# Patient Record
Sex: FEMALE | Race: BLACK OR AFRICAN AMERICAN | NOT HISPANIC OR LATINO | Employment: OTHER | URBAN - METROPOLITAN AREA
[De-identification: names, ages, dates, MRNs, and addresses within clinical notes are randomized per-mention and may not be internally consistent; named-entity substitution may affect disease eponyms.]

---

## 2017-02-02 ENCOUNTER — OFFICE VISIT (OUTPATIENT)
Dept: OBSTETRICS AND GYNECOLOGY | Facility: CLINIC | Age: 67
End: 2017-02-02
Payer: MEDICARE

## 2017-02-02 VITALS
BODY MASS INDEX: 29.03 KG/M2 | DIASTOLIC BLOOD PRESSURE: 80 MMHG | WEIGHT: 185 LBS | SYSTOLIC BLOOD PRESSURE: 140 MMHG | HEIGHT: 67 IN

## 2017-02-02 DIAGNOSIS — N90.89 VULVAL LESION: Primary | ICD-10-CM

## 2017-02-02 DIAGNOSIS — L29.2 VULVAR ITCHING: ICD-10-CM

## 2017-02-02 PROCEDURE — 99202 OFFICE O/P NEW SF 15 MIN: CPT | Mod: PBBFAC,PO | Performed by: OBSTETRICS & GYNECOLOGY

## 2017-02-02 PROCEDURE — 99203 OFFICE O/P NEW LOW 30 MIN: CPT | Mod: S$PBB,,, | Performed by: OBSTETRICS & GYNECOLOGY

## 2017-02-02 PROCEDURE — 99999 PR PBB SHADOW E&M-NEW PATIENT-LVL II: CPT | Mod: PBBFAC,,, | Performed by: OBSTETRICS & GYNECOLOGY

## 2017-02-02 RX ORDER — BENAZEPRIL HYDROCHLORIDE 40 MG/1
40 TABLET ORAL
COMMUNITY
Start: 2016-05-19 | End: 2017-04-03

## 2017-02-02 RX ORDER — NIFEDIPINE 60 MG/1
60 TABLET, EXTENDED RELEASE ORAL
COMMUNITY
Start: 2016-05-19 | End: 2017-04-03

## 2017-02-02 RX ORDER — BISOPROLOL FUMARATE AND HYDROCHLOROTHIAZIDE 10; 6.25 MG/1; MG/1
TABLET ORAL
COMMUNITY
Start: 2016-12-22

## 2017-02-02 RX ORDER — ACETAMINOPHEN AND PHENYLEPHRINE HCL 325; 5 MG/1; MG/1
10000 TABLET ORAL
COMMUNITY

## 2017-02-02 RX ORDER — GLYBURIDE 5 MG/1
5 TABLET ORAL
COMMUNITY
Start: 2016-05-19

## 2017-02-02 NOTE — MR AVS SNAPSHOT
"    [facility]  [address] [facility]  [place] [address]  Phone: [phone]  Fax: [phone]                  Jessica Barrera   2017 9:30 AM   Office Visit    Description:  Female : 1950   Provider:  Petrona Wiggins MD   Department:  [facility]           Reason for Visit     Gynecologic Exam                To Do List           Future Appointments        Provider Department Dept Phone    4/3/2017 11:15 AM Petrona Wiggins MD '[facility]/ -[phone]      Goals (5 Years of Data)     None      [facility]     [facility] Care [facility] -  Assistance  Registered nurses in the [facility] provide clinical advisement, health education, appointment booking, and other advisory services.  Call for this free service at [phone].             Medications           Message regarding Medications     Verify the changes and/or additions to your medication regime listed below are the same as discussed with your clinician today.  If any of these changes or additions are incorrect, please notify your healthcare provider.             Verify that the below list of medications is an accurate representation of the medications you are currently taking.  If none reported, the list may be blank. If incorrect, please contact your healthcare provider. Carry this list with you in case of emergency.           Current Medications     benazepril (LOTENSIN) 40 MG tablet Take 40 mg by mouth.    biotin 10,000 mcg Cap Take 10,000 mcg by mouth.    bisoprolol-hydrochlorothiazide (ZIAC) 10-6.25 mg per tablet TAKE 1 TABLET BY MOUTH DAILY.    glyBURIDE (DIABETA) 5 MG tablet Take 5 mg by mouth.    liraglutide 0.6 mg/0.1 mL, 18 mg/3 mL, subq PNIJ 0.6 mg/0.1 mL (18 mg/3 mL) PnIj 1.8 mg.    nifedipine (ADALAT CC) 60 MG TbSR Take 60 mg by mouth.           Clinical Reference Information           Your Vitals Were     BP Height Weight BMI       140/80 5' 7" (1.702 m) 83.9 kg (185 lb) 28.98 kg/m2     "   Blood Pressure          Most Recent Value    BP  (!)  140/80      Allergies as of 2/2/2017     No Known Allergies      Immunizations Administered on Date of Encounter - 2/2/2017     None      [facility] Sign-Up     Activating your [facility] account is as easy as 1-2-3!     1) Visit [email or web address], select Sign Up Now, enter this activation code and your date of birth, then select Next.  [email or web address]  Expires: 3/19/2017 10:49 AM      2) Create a username and password to use when you visit [facility] in the future and select a security question in case you lose your password and select Next.    3) Enter your e-mail address and click Sign Up!    Additional Information  If you have questions, please e-mail [email or web address] or call [phone] to talk to our [facility] staff. Remember, [facility] is NOT to be used for urgent needs. For medical emergencies, dial 911.         Language Assistance Services     ATTENTION: Language assistance services are available, free of charge. Please call [phone].      ATENCIÓN: Si habla español, tiene a sahu disposición servicios gratuitos de asistencia lingüística. Llame al [phone].     CHÚ Ý: N?u b?n nói Ti?ng Vi?t, có các d?ch v? h? tr? ngôn ng? mi?n phí dành cho b?n. G?i s? [phone].         [facility] complies with applicable Federal civil rights laws and does not discriminate on the basis of race, color, national origin, age, disability, or sex.

## 2017-02-07 NOTE — PROGRESS NOTES
CHIEF COMPLAINT:   Chief Complaint   Patient presents with    Gynecologic Exam       HISTORY OF PRESENT ILLNESS    Jessica Barrera 67 y.o.  complains of: years of on and off itching with crops of bumps that come up, then heal. She was seen at [facility] with her latest episode and says ssisamar was tested for herpes but it was negative. Told to f/u w/  Gyn to find out what it is .she is sexually active w . He does not have lesions or HSV history.   No STD hx. No other c/.   Her glc levels are in control and the itching and bumps do not correlate with a higher glc level. \  decl std ck as sagar just had this done at [facility].    HISTORY  There is no problem list on file for this patient.      Past Medical History   Diagnosis Date    Diabetes mellitus     Hypertension     Stroke        Past Surgical History   Procedure Laterality Date    Hysterectomy         Family History   Problem Relation Age of Onset    Breast cancer Neg Hx     Colon cancer Neg Hx     Ovarian cancer Neg Hx        Social History     Social History    Marital status:      Spouse name: N/A    Number of children: N/A    Years of education: N/A     Social History Main Topics    Smoking status: Never Smoker    Smokeless tobacco: None    Alcohol use No    Drug use: No    Sexual activity: No     Other Topics Concern    None     Social History Narrative    None       Current Outpatient Prescriptions   Medication Sig Dispense Refill    benazepril (LOTENSIN) 40 MG tablet Take 40 mg by mouth.      biotin 10,000 mcg Cap Take 10,000 mcg by mouth.      bisoprolol-hydrochlorothiazide (ZIAC) 10-6.25 mg per tablet TAKE 1 TABLET BY MOUTH DAILY.      glyBURIDE (DIABETA) 5 MG tablet Take 5 mg by mouth.      liraglutide 0.6 mg/0.1 mL, 18 mg/3 mL, subq PNIJ 0.6 mg/0.1 mL (18 mg/3 mL) PnIj 1.8 mg.      nifedipine (ADALAT CC) 60 MG TbSR Take 60 mg by mouth.       No current facility-administered medications for this visit.        Review of  patient's allergies indicates:  No Known Allergies        PHYSICAL EXAM     Vitals:    02/02/17 1011   BP: (!) 140/80       PAIN SCALE: 0/10 None    ROS:  GENERAL: No fever, chills, fatigability or weight loss.  ABDOMEN: Appetite fine. No weight loss. Denies diarrhea, abdominal pain, hematemesis or blood in stool.  URINARY: No flank pain, dysuria or hematuria.  REPRODUCTIVE: No abnormal vaginal bleeding.   BREASTS: Breasts symmetric, nontender and no lumps detected.    PE:   APPEARANCE: Well nourished, well developed, in no acute distress.  ABDOMEN: Soft. No tenderness or masses.   PELVIC:   VULVA: No active  lesions. Normal female genitalia and healthy skin, however there is evidence of healed lesions - there are fully healed but hypopigmented round spots about 3-4mm in crops of 5-7 along the labia minor bilaterally and buttock crease just cephalad to the anus.   URETHRAL MEATUS: Normal size and location, no lesions, no prolapse.  URETHRA: No masses, tenderness, prolapse or scarring.  VAGINA: Moist and well rugated, no discharge, no significant cystocele or rectocele.  ANUS PERINEUM: No lesions, no relaxation, no external hemorrhoids.      DIAGNOSIS:   1. Vulvar lesion  2. Vulvar itch      PLAN:   Med rec release from [facility]  Pt to rtc for any provider available to see her ASAP when she has this episode again, so it can be evaluated and tested, especially with a culture for herpes.    COUNSELING:  Patient was counseled today on A.C.S. Pap guidelines and recommendations for yearly pelvic exams, mammograms and monthly self breast exams; to see her PCP for other health maintenance.   Discussed highest likelihood for diagnosis is herpes. Counseling on this and transmission given.     FOLLOW-UP: With me for annual or as above.

## 2017-04-03 ENCOUNTER — OFFICE VISIT (OUTPATIENT)
Dept: OBSTETRICS AND GYNECOLOGY | Facility: CLINIC | Age: 67
End: 2017-04-03
Payer: MEDICARE

## 2017-04-03 VITALS
WEIGHT: 188.69 LBS | BODY MASS INDEX: 29.62 KG/M2 | SYSTOLIC BLOOD PRESSURE: 152 MMHG | DIASTOLIC BLOOD PRESSURE: 88 MMHG | HEIGHT: 67 IN

## 2017-04-03 DIAGNOSIS — N90.5 VULVAR ATROPHY: ICD-10-CM

## 2017-04-03 DIAGNOSIS — N90.89 VULVAR LESION: ICD-10-CM

## 2017-04-03 DIAGNOSIS — Z12.31 ENCOUNTER FOR SCREENING MAMMOGRAM FOR MALIGNANT NEOPLASM OF BREAST: ICD-10-CM

## 2017-04-03 DIAGNOSIS — Z01.419 WELL WOMAN EXAM WITH ROUTINE GYNECOLOGICAL EXAM: Primary | ICD-10-CM

## 2017-04-03 DIAGNOSIS — L29.2 VULVAR ITCHING: ICD-10-CM

## 2017-04-03 PROCEDURE — G0101 CA SCREEN;PELVIC/BREAST EXAM: HCPCS | Mod: S$PBB,,, | Performed by: OBSTETRICS & GYNECOLOGY

## 2017-04-03 PROCEDURE — 88305 TISSUE EXAM BY PATHOLOGIST: CPT | Mod: 26,,, | Performed by: PATHOLOGY

## 2017-04-03 PROCEDURE — 56605 BIOPSY OF VULVA/PERINEUM: CPT | Mod: PBBFAC | Performed by: OBSTETRICS & GYNECOLOGY

## 2017-04-03 PROCEDURE — 88312 SPECIAL STAINS GROUP 1: CPT | Mod: 26,,, | Performed by: PATHOLOGY

## 2017-04-03 PROCEDURE — 88305 TISSUE EXAM BY PATHOLOGIST: CPT | Performed by: PATHOLOGY

## 2017-04-03 PROCEDURE — 56605 BIOPSY OF VULVA/PERINEUM: CPT | Mod: S$PBB,,, | Performed by: OBSTETRICS & GYNECOLOGY

## 2017-04-03 PROCEDURE — 99213 OFFICE O/P EST LOW 20 MIN: CPT | Mod: PBBFAC,25 | Performed by: OBSTETRICS & GYNECOLOGY

## 2017-04-03 PROCEDURE — G0101 CA SCREEN;PELVIC/BREAST EXAM: HCPCS | Mod: PBBFAC | Performed by: OBSTETRICS & GYNECOLOGY

## 2017-04-03 PROCEDURE — 56606 BIOPSY OF VULVA/PERINEUM: CPT | Mod: S$PBB,,, | Performed by: OBSTETRICS & GYNECOLOGY

## 2017-04-03 PROCEDURE — 99999 PR PBB SHADOW E&M-EST. PATIENT-LVL III: CPT | Mod: PBBFAC,,, | Performed by: OBSTETRICS & GYNECOLOGY

## 2017-04-03 PROCEDURE — 56606 BIOPSY OF VULVA/PERINEUM: CPT | Mod: PBBFAC | Performed by: OBSTETRICS & GYNECOLOGY

## 2017-04-03 RX ORDER — METOPROLOL SUCCINATE 50 MG/1
50 TABLET, EXTENDED RELEASE ORAL
COMMUNITY
Start: 2017-02-14

## 2017-04-03 RX ORDER — AMLODIPINE BESYLATE 5 MG/1
5 TABLET ORAL
COMMUNITY
Start: 2017-02-14

## 2017-04-03 RX ORDER — LISINOPRIL AND HYDROCHLOROTHIAZIDE 20; 25 MG/1; MG/1
1 TABLET ORAL
COMMUNITY
Start: 2017-02-14 | End: 2018-02-14

## 2017-04-03 RX ORDER — ATORVASTATIN CALCIUM 10 MG/1
10 TABLET, FILM COATED ORAL NIGHTLY
Refills: 3 | COMMUNITY
Start: 2017-02-15 | End: 2017-04-03

## 2017-04-03 NOTE — MR AVS SNAPSHOT
[facility]  [address]  [place] [address]  Phone: [phone]  Fax: [phone]                  Jessica Barrera   4/3/2017 11:15 AM   Office Visit    Description:  Female : 1950   Provider:  Petrona Wiggins MD   Department:  [facility]           Reason for Visit     Well Woman     Vaginal Itching           Diagnoses this Visit        Comments    Well woman exam with routine gynecological exam    -  Primary     Encounter for screening mammogram for malignant neoplasm of breast                To Do List           Future Appointments        Provider Department Dept Phone    2017 10:00 AM ON[facility] MAMMO1 [facility] [phone]    5/3/2017 9:15 AM [facility] -[phone]      Goals (5 Years of Data)     None      [facility] On [facility]     [facility] Care [facility] -  Assistance  Unless otherwise directed by your provider, please contact [facility], our nurse care line that is available for  assistance.     Registered nurses in the [facility] provide: appointment scheduling, clinical advisement, health education, and other advisory services.  Call: [phone] (toll free)               Medications           Message regarding Medications     Verify the changes and/or additions to your medication regime listed below are the same as discussed with your clinician today.  If any of these changes or additions are incorrect, please notify your healthcare provider.        STOP taking these medications     atorvastatin (LIPITOR) 10 MG tablet Take 10 mg by mouth nightly.    benazepril (LOTENSIN) 40 MG tablet Take 40 mg by mouth.    nifedipine (ADALAT CC) 60 MG TbSR Take 60 mg by mouth.           Verify that the below list of medications is an accurate representation of the medications you are currently taking.  If none reported, the list may be blank. If incorrect, please contact your healthcare provider.  "Carry this list with you in case of emergency.           Current Medications     amlodipine (NORVASC) 5 MG tablet Take 5 mg by mouth.    biotin 10,000 mcg Cap Take 10,000 mcg by mouth.    bisoprolol-hydrochlorothiazide (ZIAC) 10-6.25 mg per tablet TAKE 1 TABLET BY MOUTH DAILY.    canagliflozin (INVOKANA) 300 mg Tab tablet Take 1 tablet by mouth.    glyBURIDE (DIABETA) 5 MG tablet Take 5 mg by mouth.    liraglutide 0.6 mg/0.1 mL, 18 mg/3 mL, subq PNIJ 0.6 mg/0.1 mL (18 mg/3 mL) PnIj 1.8 mg.    lisinopril-hydrochlorothiazide (PRINZIDE,ZESTORETIC) 20-25 mg Tab Take 1 tablet by mouth.    metoprolol succinate (TOPROL-XL) 50 MG 24 hr tablet Take 50 mg by mouth.           Clinical Reference Information           Your Vitals Were     BP Height Weight BMI       152/88 5' 7" (1.702 m) 85.6 kg (188 lb 11.4 oz) 29.56 kg/m2       Blood Pressure          Most Recent Value    BP  (!)  152/88      Allergies as of 4/3/2017     Metformin      Immunizations Administered on Date of Encounter - 4/3/2017     None      Orders Placed During Today's Visit     Future Labs/Procedures Expected by Expires    Mammo Digital Screening Bilat with CAD  4/3/2017 6/3/2018      [facility] Sign-Up     Activating your [facility] account is as easy as 1-2-3!     1) Visit [email or web address], select Sign Up Now, enter this activation code and your date of birth, then select Next.  [email or web address]  Expires: 5/18/2017 12:48 PM      2) Create a username and password to use when you visit [facility] in the future and select a security question in case you lose your password and select Next.    3) Enter your e-mail address and click Sign Up!    Additional Information  If you have questions, please e-mail [email or web address] or call [phone] to talk to our [facility] staff. Remember, [facility] is NOT to be used for urgent needs. For medical emergencies, dial 911.         Language Assistance Services     ATTENTION: Language assistance services are available, free " of charge. Please call [phone].      ATENCIÓN: Si habla español, tiene a sahu disposición servicios gratuitos de asistencia lingüística. Llame al [phone].     CHÚ Ý: N?u b?n nói Ti?ng Vi?t, có các d?ch v? h? tr? ngôn ng? mi?n phí dành cho b?n. G?i s? [phone].         [facility] complies with applicable Federal civil rights laws and does not discriminate on the basis of race, color, national origin, age, disability, or sex.

## 2017-04-07 NOTE — PROGRESS NOTES
CHIEF COMPLAINT   Gynecologic Exam  Chief Complaint   Patient presents with    Well Woman    Vaginal Itching        HISTORY OF PRESENT ILLNESS  Patient presents for annual exam. The patient has c/o worsening vulvar itch. Alice on L vulva.    No LMP recorded. Patient has had a hysterectomy. benign indications.  Ovaries remain.      Reports no bowel movement irregularities from baseline, bloating, or weight loss.   ERT:   None      Health Maintenance   Topic Date Due    Hepatitis C Screening  1950    Lipid Panel  1950    Hemoglobin A1c  1950    Foot Exam  01/31/1958    Eye Exam  01/31/1958    TETANUS VACCINE  01/31/1966    Mammogram  01/31/1988    DEXA SCAN  01/31/1988    Colonoscopy  01/31/1998    Zoster Vaccine  01/31/2008    Pneumococcal (65+) (1 of 2 - PCV13) 01/31/2013    Influenza Vaccine  08/01/2016       HISTORY  Patient Active Problem List   Diagnosis    Well woman exam with routine gynecological exam    Vulvar lesion    Vulvar itching    Vulvar atrophy       Past Medical History:   Diagnosis Date    Diabetes mellitus     Hypertension     Stroke        Past Surgical History:   Procedure Laterality Date    HYSTERECTOMY         Family History   Problem Relation Age of Onset    Breast cancer Neg Hx     Colon cancer Neg Hx     Ovarian cancer Neg Hx        Social History     Social History    Marital status:      Spouse name: N/A    Number of children: N/A    Years of education: N/A     Social History Main Topics    Smoking status: Never Smoker    Smokeless tobacco: Never Used    Alcohol use No    Drug use: No    Sexual activity: No     Other Topics Concern    None     Social History Narrative       Current Outpatient Prescriptions   Medication Sig Dispense Refill    amlodipine (NORVASC) 5 MG tablet Take 5 mg by mouth.      biotin 10,000 mcg Cap Take 10,000 mcg by mouth.      bisoprolol-hydrochlorothiazide (ZIAC) 10-6.25 mg per tablet TAKE 1 TABLET BY  MOUTH DAILY.      canagliflozin (INVOKANA) 300 mg Tab tablet Take 1 tablet by mouth.      glyBURIDE (DIABETA) 5 MG tablet Take 5 mg by mouth.      liraglutide 0.6 mg/0.1 mL, 18 mg/3 mL, subq PNIJ 0.6 mg/0.1 mL (18 mg/3 mL) PnIj 1.8 mg.      lisinopril-hydrochlorothiazide (PRINZIDE,ZESTORETIC) 20-25 mg Tab Take 1 tablet by mouth.      metoprolol succinate (TOPROL-XL) 50 MG 24 hr tablet Take 50 mg by mouth.       No current facility-administered medications for this visit.        Review of patient's allergies indicates:   Allergen Reactions    Metformin Diarrhea         PHYSICAL EXAM     Vitals:    04/03/17 1143   BP: (!) 152/88       PAIN SCALE: 0/10 None    ROS:  GENERAL: No fever, chills, fatigability or weight loss.  ABDOMEN: Appetite fine. No weight loss. Denies diarrhea, abdominal pain, hematemesis or blood in stool.  No change in bowel movement pattern.  URINARY: No flank pain, dysuria or hematuria.  REPRODUCTIVE: No abnormal vaginal bleeding.  BREASTS: Breasts symmetric, nontender and no lumps detected.    PE:   APPEARANCE: Well nourished, well developed, in no acute distress.  NECK: Neck symmetric without masses or thyromegaly.     NODES: No inguinal lymph node enlargement.  ABDOMEN: Soft. No tenderness or masses. No hepatosplenomegaly. No hernias.  BREASTS: Symmetrical, no skin changes or visible lesions. No palpable masses, nipple discharge or adenopathy bilaterally.    PELVIC:   VULVA: Skin around perineum with rash: 'parchment paper'-like with slight pink coloration and clear demarcation with healthy skin, in keyhole distribution around vulva and rectum, no discrete lesions   There is a white irreg skin discoloration in the fold of the lab mn+mj on the left and R lab mn the edge has white discoloration   URETHRAL MEATUS: Normal size and location, no lesions, no prolapse.  URETHRA: No masses, tenderness, prolapse or scarring.  PELVIC: Normal external female genitalia without lesions. Normal hair  distribution. Adequate perineal body, normal urethral meatus. Vagina dry and narrow without lesions or discharge. No significant cystocele or rectocele. Uterus and cervix surgically absent. Bimanual exam revealed no masses, tenderness or abnormality.    ANUS, PERINEUM: no masses, external hemorrhoids noted.        PROCEDURE  After lidocaine jel and lido leia injectione with epi, small sample of skin was grasped and removed, and sent to pathology from R lab mn, and punch biopsy on L. Silver ntrate applied. Hemostatic. Pt audra well.      DIAGNOSIS:   1. Annual exam  2. Physiologic atrophy  3. Lichen   4. Vulvar lesions      PLAN:    Mammogram  Colonoscopy  dexa    MEDICATIONS PRESCRIBED: calcium vitamin D weight bearing exercise      COUNSELING:  Patient was counseled today on A.C.S. Pap guidelines and recommendations for yearly pelvic exams, mammograms and monthly self breast exams; to see her PCP for other health maintenance.       FOLLOW-UP: With me for heaing and results.

## 2017-04-10 ENCOUNTER — TELEPHONE (OUTPATIENT)
Dept: OBSTETRICS AND GYNECOLOGY | Facility: CLINIC | Age: 67
End: 2017-04-10

## 2017-04-10 RX ORDER — CLOTRIMAZOLE AND BETAMETHASONE DIPROPIONATE 10; .64 MG/G; MG/G
CREAM TOPICAL
Qty: 15 G | Refills: 2 | Status: SHIPPED | OUTPATIENT
Start: 2017-04-10

## 2017-04-10 RX ORDER — FLUCONAZOLE 150 MG/1
150 TABLET ORAL DAILY
Qty: 1 TABLET | Refills: 1 | Status: SHIPPED | OUTPATIENT
Start: 2017-04-10 | End: 2017-04-11

## 2017-04-10 NOTE — TELEPHONE ENCOUNTER
Reviewed path results w pt. rec lotrisone and diflucan for the candida overgrowth and will re evlauate at her next 5/3 appt.

## 2017-04-20 ENCOUNTER — HOSPITAL ENCOUNTER (OUTPATIENT)
Dept: RADIOLOGY | Facility: HOSPITAL | Age: 67
Discharge: HOME | End: 2017-04-20
Attending: OBSTETRICS & GYNECOLOGY
Payer: MEDICARE

## 2017-04-20 DIAGNOSIS — Z12.31 ENCOUNTER FOR SCREENING MAMMOGRAM FOR MALIGNANT NEOPLASM OF BREAST: ICD-10-CM

## 2017-04-20 DIAGNOSIS — Z01.419 WELL WOMAN EXAM WITH ROUTINE GYNECOLOGICAL EXAM: ICD-10-CM

## 2017-04-20 PROCEDURE — 77063 BREAST TOMOSYNTHESIS BI: CPT | Mod: 26,,, | Performed by: RADIOLOGY

## 2017-04-20 PROCEDURE — 77067 SCR MAMMO BI INCL CAD: CPT | Mod: 26,,, | Performed by: RADIOLOGY

## 2017-04-20 PROCEDURE — 77067 SCR MAMMO BI INCL CAD: CPT | Mod: TC

## 2017-05-04 ENCOUNTER — OFFICE VISIT (OUTPATIENT)
Dept: OBSTETRICS AND GYNECOLOGY | Facility: CLINIC | Age: 67
End: 2017-05-04
Payer: MEDICARE

## 2017-05-04 VITALS
WEIGHT: 180 LBS | BODY MASS INDEX: 28.25 KG/M2 | HEIGHT: 67 IN | DIASTOLIC BLOOD PRESSURE: 70 MMHG | SYSTOLIC BLOOD PRESSURE: 126 MMHG

## 2017-05-04 DIAGNOSIS — L90.0 LICHEN SCLEROSUS: ICD-10-CM

## 2017-05-04 DIAGNOSIS — N90.5 VULVAR ATROPHY: Primary | ICD-10-CM

## 2017-05-04 PROCEDURE — 99212 OFFICE O/P EST SF 10 MIN: CPT | Mod: PBBFAC,PO | Performed by: OBSTETRICS & GYNECOLOGY

## 2017-05-04 PROCEDURE — 99999 PR PBB SHADOW E&M-EST. PATIENT-LVL II: CPT | Mod: PBBFAC,,, | Performed by: OBSTETRICS & GYNECOLOGY

## 2017-05-04 PROCEDURE — 99212 OFFICE O/P EST SF 10 MIN: CPT | Mod: S$PBB,,, | Performed by: OBSTETRICS & GYNECOLOGY

## 2017-05-04 RX ORDER — FLUCONAZOLE 150 MG/1
150 TABLET ORAL DAILY
Qty: 1 TABLET | Refills: 1 | Status: SHIPPED | OUTPATIENT
Start: 2017-05-04 | End: 2017-05-05

## 2021-12-10 ENCOUNTER — IMMUNIZATION (OUTPATIENT)
Dept: PRIMARY CARE CLINIC | Facility: CLINIC | Age: 71
End: 2021-12-10
Payer: MEDICARE

## 2021-12-10 DIAGNOSIS — Z23 NEED FOR VACCINATION: Primary | ICD-10-CM

## 2021-12-10 PROCEDURE — 91300 COVID-19, MRNA, LNP-S, PF, 30 MCG/0.3 ML DOSE VACCINE: CPT | Mod: PBBFAC | Performed by: FAMILY MEDICINE

## 2021-12-10 PROCEDURE — 0003A COVID-19, MRNA, LNP-S, PF, 30 MCG/0.3 ML DOSE VACCINE: CPT | Mod: CV19,PBBFAC | Performed by: FAMILY MEDICINE
